# Patient Record
Sex: MALE | Race: WHITE | NOT HISPANIC OR LATINO | Employment: UNEMPLOYED | URBAN - METROPOLITAN AREA
[De-identification: names, ages, dates, MRNs, and addresses within clinical notes are randomized per-mention and may not be internally consistent; named-entity substitution may affect disease eponyms.]

---

## 2021-02-09 ENCOUNTER — OFFICE VISIT (OUTPATIENT)
Dept: FAMILY MEDICINE CLINIC | Facility: CLINIC | Age: 31
End: 2021-02-09

## 2021-02-09 VITALS
HEART RATE: 68 BPM | BODY MASS INDEX: 27.3 KG/M2 | RESPIRATION RATE: 14 BRPM | OXYGEN SATURATION: 98 % | WEIGHT: 195 LBS | DIASTOLIC BLOOD PRESSURE: 80 MMHG | TEMPERATURE: 98 F | HEIGHT: 71 IN | SYSTOLIC BLOOD PRESSURE: 118 MMHG

## 2021-02-09 DIAGNOSIS — J45.52 SEVERE PERSISTENT ASTHMA WITH STATUS ASTHMATICUS: ICD-10-CM

## 2021-02-09 DIAGNOSIS — G25.81 RESTLESS LEG: Primary | ICD-10-CM

## 2021-02-09 DIAGNOSIS — K21.9 GASTROESOPHAGEAL REFLUX DISEASE WITHOUT ESOPHAGITIS: ICD-10-CM

## 2021-02-09 PROBLEM — J45.50 SEVERE PERSISTENT ASTHMA WITHOUT COMPLICATION: Status: ACTIVE | Noted: 2021-02-09

## 2021-02-09 PROCEDURE — 99204 OFFICE O/P NEW MOD 45 MIN: CPT | Performed by: FAMILY MEDICINE

## 2021-02-09 RX ORDER — ALBUTEROL SULFATE 90 UG/1
2 AEROSOL, METERED RESPIRATORY (INHALATION) 4 TIMES DAILY PRN
COMMUNITY
Start: 2020-12-07 | End: 2021-02-09 | Stop reason: SDUPTHER

## 2021-02-09 RX ORDER — BUPRENORPHINE HYDROCHLORIDE AND NALOXONE HYDROCHLORIDE 8.6; 2.1 MG/1; MG/1
TABLET, ORALLY DISINTEGRATING SUBLINGUAL
COMMUNITY
Start: 2021-01-11

## 2021-02-09 RX ORDER — PANTOPRAZOLE SODIUM 40 MG/1
40 TABLET, DELAYED RELEASE ORAL DAILY
Qty: 30 TABLET | Refills: 3 | Status: SHIPPED | OUTPATIENT
Start: 2021-02-09

## 2021-02-09 RX ORDER — ROPINIROLE 0.25 MG/1
0.25 TABLET, FILM COATED ORAL NIGHTLY
Qty: 30 TABLET | Refills: 1 | Status: SHIPPED | OUTPATIENT
Start: 2021-02-09 | End: 2021-03-10

## 2021-02-09 RX ORDER — ALBUTEROL SULFATE 90 UG/1
2 AEROSOL, METERED RESPIRATORY (INHALATION) 4 TIMES DAILY PRN
Qty: 18 G | Refills: 3 | Status: SHIPPED | OUTPATIENT
Start: 2021-02-09

## 2021-02-09 NOTE — PROGRESS NOTES
New Patient Office Visit      Patient Name: Heath Murcia  : 1990   MRN: [id number]     Chief Complaint:    Chief Complaint   Patient presents with   • Establish Care   • Wheezing     x 3-4 months       History of Present Illness: Heath Murcia is a 30 y.o. male who is here today to establish care.  Patient presents with uncontrolled asthma-like symptoms.  Patient reports wheezing using albuterol which helps.  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  Patient says he wears a gas mask and can't wear it very long due to his asthma.  Patient is never had PFTs.    GERD-patient has uncontrolled reflux.  Wants to start medication.    Restless leg syndrome-patient has restless leg syndrome wants to start ropinirole.  Patient has been on gabapentin previously 600 mg which worked.    Opioid use disorder-patient is on Suboxone and is followed by Dr. Coffman.      Physical exam: Patient has rhonchi and wheezing On lung exam.  Heart exam normal.  Mood and affect appropriate.    Subjective          Past Medical History:   Past Medical History:   Diagnosis Date   • Restless leg syndrome        Past Surgical History: History reviewed. No pertinent surgical history.    Family History:   Family History   Problem Relation Age of Onset   • Diabetes Mother    • Heart disease Mother        Social History:   Social History     Socioeconomic History   • Marital status:      Spouse name: Not on file   • Number of children: Not on file   • Years of education: Not on file   • Highest education level: Not on file   Tobacco Use   • Smoking status: Former Smoker     Packs/day: 1.00     Types: Cigarettes     Start date: 2003     Quit date: 2018     Years since quitting: 3.0   • Smokeless tobacco: Current User     Types: Chew   Substance and Sexual Activity   • Alcohol use: Never     Frequency: Never   • Drug use: Never       Medications:     Current Outpatient Medications:   •  albuterol sulfate HFA  "108 (90 Base) MCG/ACT inhaler, Inhale 2 puffs 4 (Four) Times a Day As Needed for Wheezing., Disp: 18 g, Rfl: 3  •  Fluticasone-Umeclidin-Vilant 100-62.5-25 MCG/INH aerosol powder , Inhale 1 puff Daily., Disp: 60 each, Rfl: 3  •  pantoprazole (Protonix) 40 MG EC tablet, Take 1 tablet by mouth Daily., Disp: 30 tablet, Rfl: 3  •  rOPINIRole (Requip) 0.25 MG tablet, Take 1 tablet by mouth Every Night. Take 1 hour before bedtime., Disp: 30 tablet, Rfl: 1  •  Zubsolv 8.6-2.1 MG sublingual tablet, DISSOLVE 2.25 TABLETS UNDER THE TONGUE EVERY DAY, Disp: , Rfl:     Allergies:   No Known Allergies    Objective     Physical Exam:  Vital Signs:   Vitals:    02/09/21 1500   BP: 118/80   Pulse: 68   Resp: 14   Temp: 98 °F (36.7 °C)   TempSrc: Temporal   SpO2: 98%   Weight: 88.5 kg (195 lb)   Height: 180.3 cm (71\")   PainSc: 0-No pain     Body mass index is 27.2 kg/m².       Assessment / Plan      Assessment/Plan:   Diagnoses and all orders for this visit:    1. Restless leg (Primary)  -     rOPINIRole (Requip) 0.25 MG tablet; Take 1 tablet by mouth Every Night. Take 1 hour before bedtime.  Dispense: 30 tablet; Refill: 1    2. Severe persistent asthma with status asthmaticus  -     Fluticasone-Umeclidin-Vilant 100-62.5-25 MCG/INH aerosol powder ; Inhale 1 puff Daily.  Dispense: 60 each; Refill: 3  -     albuterol sulfate  (90 Base) MCG/ACT inhaler; Inhale 2 puffs 4 (Four) Times a Day As Needed for Wheezing.  Dispense: 18 g; Refill: 3    3. Gastroesophageal reflux disease without esophagitis  -     pantoprazole (Protonix) 40 MG EC tablet; Take 1 tablet by mouth Daily.  Dispense: 30 tablet; Refill: 3         1. Upon follow-up check on his GERD and asthma symptoms.  Also check on his restless leg.  We can do a annual visit at that time.  Recommend STD, HIV and hepatitis C testing.  Would also recommend CMP.      Follow Up:   Return in about 1 month (around 3/9/2021).    Yaya Lawrence,   [facility] [place]       "       Please note that portions of this note may have been completed with a voice recognition program. Efforts were made to edit the dictations, but occasionally words are mistranscribed.

## 2021-03-10 ENCOUNTER — TELEPHONE (OUTPATIENT)
Dept: FAMILY MEDICINE CLINIC | Facility: CLINIC | Age: 31
End: 2021-03-10

## 2021-03-10 DIAGNOSIS — G25.81 RESTLESS LEG: Primary | ICD-10-CM

## 2021-03-10 RX ORDER — PRAMIPEXOLE DIHYDROCHLORIDE 0.12 MG/1
0.12 TABLET ORAL NIGHTLY
Qty: 30 TABLET | Refills: 1 | Status: SHIPPED | OUTPATIENT
Start: 2021-03-10 | End: 2021-03-15

## 2021-03-10 NOTE — TELEPHONE ENCOUNTER
----- Message from Yaya Lawrence DO sent at 3/10/2021 11:58 AM EST -----  Regarding: RE: Med Issue  Please have the patient  pramipexole and stop ropinirole.  Patient should try this for a week.  And after 1 week patient can double the dose.  If this does not improve his symptoms I would like patient to follow-up in 2 to 4 weeks.  ----- Message -----  From: Leila Fraire CMA  Sent: 3/10/2021   8:22 AM EST  To: Yaya Lawrence DO  Subject: FW: Med Issue                                      ----- Message -----  From: Jayne Barnes RegSched Rep  Sent: 3/9/2021   6:07 PM EST  To: Leila Fraire CMA  Subject: Med Issue                                        Pt stated rOPINIRole (Requip) 0.25 MG tablet is making him wake up everyday with a headache and would like to try something else if possible. Pls call and advise @ [phone]

## 2021-03-15 ENCOUNTER — OFFICE VISIT (OUTPATIENT)
Dept: FAMILY MEDICINE CLINIC | Facility: CLINIC | Age: 31
End: 2021-03-15

## 2021-03-15 VITALS
TEMPERATURE: 98.4 F | RESPIRATION RATE: 20 BRPM | OXYGEN SATURATION: 99 % | BODY MASS INDEX: 27.19 KG/M2 | HEIGHT: 71 IN | HEART RATE: 70 BPM | WEIGHT: 194.2 LBS | DIASTOLIC BLOOD PRESSURE: 70 MMHG | SYSTOLIC BLOOD PRESSURE: 122 MMHG

## 2021-03-15 DIAGNOSIS — J45.50 SEVERE PERSISTENT ASTHMA WITHOUT COMPLICATION: ICD-10-CM

## 2021-03-15 DIAGNOSIS — G25.81 RESTLESS LEG: Primary | ICD-10-CM

## 2021-03-15 PROCEDURE — 99214 OFFICE O/P EST MOD 30 MIN: CPT | Performed by: FAMILY MEDICINE

## 2021-03-15 RX ORDER — PRAMIPEXOLE DIHYDROCHLORIDE 0.12 MG/1
0.12 TABLET ORAL NIGHTLY
Qty: 90 TABLET | Refills: 0 | Status: SHIPPED | OUTPATIENT
Start: 2021-03-15

## 2021-03-15 NOTE — PATIENT INSTRUCTIONS
Mirapex  Take 2 tablets for 4 days  Then take 3 tablets for 4 days  Then take 4 tablets for 4 days  Then take 5 tablets for 4 days

## 2021-03-15 NOTE — PROGRESS NOTES
Follow Up Office Visit      Patient Name: Heath Murcia  : 1990   MRN: [id number]     Chief Complaint:    Chief Complaint   Patient presents with   • Follow-up     restless leg, check up       History of Present Illness: Heath Murcia is a 30 y.o. male who is here today to follow up with restless leg.  We tried ropinirole but this caused headache.  Patient had a stop the medication and we started pramipexole in the meantime.  Patient presents on 1 tablet/day.  Patient is willing to increase this dose.  Again patient did have success with restless leg on gabapentin.  We will try this next.    Patient's reflux is controlled.    Patient's asthma has greatly improved and has not used albuterol since last visit.  Patient has been using try Trelegy once a day.  He use it twice a day every once in a while told him not to do this.    Patient reports that his Suboxone is gone to be very expensive but he can pay out-of-pocket for.  I have advised patient to try to get a different brand or generic version.      Review of systems was negative for shortness of breath and chest pain but positive for restless leg    Patient's physical exam showed appropriate mood and affect and his heart and lung exam was normal.      Subjective        I have reviewed and the following portions of the patient's history were updated as appropriate: past family history, past medical history, past social history, past surgical history and problem list.    Medications:     Current Outpatient Medications:   •  albuterol sulfate  (90 Base) MCG/ACT inhaler, Inhale 2 puffs 4 (Four) Times a Day As Needed for Wheezing., Disp: 18 g, Rfl: 3  •  Fluticasone-Umeclidin-Vilant 100-62.5-25 MCG/INH aerosol powder , Inhale 1 puff Daily., Disp: 60 each, Rfl: 3  •  pantoprazole (Protonix) 40 MG EC tablet, Take 1 tablet by mouth Daily., Disp: 30 tablet, Rfl: 3  •  Zubsolv 8.6-2.1 MG sublingual tablet, DISSOLVE 2.25 TABLETS UNDER THE TONGUE EVERY DAY,  "Disp: , Rfl:   •  pramipexole (Mirapex) 0.125 MG tablet, Take 1 tablet by mouth Every Night., Disp: 90 tablet, Rfl: 0    Allergies:   No Known Allergies    Objective     Physical Exam: Please see [facility] for physical exam  Vital Signs:   Vitals:    03/15/21 1618   BP: 122/70   Pulse: 70   Resp: 20   Temp: 98.4 °F (36.9 °C)   TempSrc: Temporal   SpO2: 99%   Weight: 88.1 kg (194 lb 3.2 oz)   Height: 180.3 cm (70.98\")   PainSc: 0-No pain     Body mass index is 27.1 kg/m².          Assessment / Plan      Assessment/Plan:   Diagnoses and all orders for this visit:    1. Restless leg (Primary)  -     pramipexole (Mirapex) 0.125 MG tablet; Take 1 tablet by mouth Every Night.  Dispense: 90 tablet; Refill: 0    2. Severe persistent asthma without complication    Run try Mirapex and titrated on dose-dependent basis.  I  recommend taking 2 tablets/day for the next 4 days then take 3 tablets for 4 days and 4 for 4 days then 5 for 4 days and stop at that point.  If that does not work will run try gabapentin.    I recommend continuing trilogy currently.  I have advised patient to use it once per day not twice.  Patient continues albuterol as needed after that.  In 6 months we will consider bringing him down to anoro .  Currently he is getting this paid for through insurance.      Patient will follow up in 3 weeks to discuss treatment for Mirapex.  If not working start gabapentin.  Follow-up in August after that.    Follow Up:   Return in about 3 weeks (around 4/5/2021).    Yaya Lawrence DO  [facility]       Please note that portions of this note may have been completed with a voice recognition program. Efforts were made to edit the dictations, but occasionally words are mistranscribed.   "

## 2021-05-26 DIAGNOSIS — J45.52 SEVERE PERSISTENT ASTHMA WITH STATUS ASTHMATICUS: ICD-10-CM

## 2021-06-03 DIAGNOSIS — J45.52 SEVERE PERSISTENT ASTHMA WITH STATUS ASTHMATICUS: Primary | ICD-10-CM

## 2021-06-03 RX ORDER — BUDESONIDE AND FORMOTEROL FUMARATE DIHYDRATE 160; 4.5 UG/1; UG/1
2 AEROSOL RESPIRATORY (INHALATION)
Qty: 10.2 G | Refills: 12 | Status: SHIPPED | OUTPATIENT
Start: 2021-06-03